# Patient Record
Sex: MALE | HISPANIC OR LATINO | Employment: FULL TIME | URBAN - METROPOLITAN AREA
[De-identification: names, ages, dates, MRNs, and addresses within clinical notes are randomized per-mention and may not be internally consistent; named-entity substitution may affect disease eponyms.]

---

## 2021-07-13 ENCOUNTER — HOSPITAL ENCOUNTER (EMERGENCY)
Facility: HOSPITAL | Age: 36
Discharge: HOME | End: 2021-07-13
Attending: EMERGENCY MEDICINE | Admitting: EMERGENCY MEDICINE

## 2021-07-13 ENCOUNTER — APPOINTMENT (OUTPATIENT)
Dept: CT IMAGING | Facility: HOSPITAL | Age: 36
End: 2021-07-13

## 2021-07-13 VITALS
WEIGHT: 180 LBS | SYSTOLIC BLOOD PRESSURE: 135 MMHG | OXYGEN SATURATION: 99 % | RESPIRATION RATE: 18 BRPM | TEMPERATURE: 98.7 F | DIASTOLIC BLOOD PRESSURE: 83 MMHG | HEART RATE: 88 BPM | HEIGHT: 68 IN | BODY MASS INDEX: 27.28 KG/M2

## 2021-07-13 DIAGNOSIS — R14.0 ABDOMINAL BLOATING: Primary | ICD-10-CM

## 2021-07-13 LAB
ALBUMIN SERPL-MCNC: 4.4 G/DL (ref 3.5–5.2)
ALBUMIN/GLOB SERPL: 1.4 G/DL
ALP SERPL-CCNC: 60 U/L (ref 39–117)
ALT SERPL W P-5'-P-CCNC: 21 U/L (ref 1–41)
ANION GAP SERPL CALCULATED.3IONS-SCNC: 9.1 MMOL/L (ref 5–15)
AST SERPL-CCNC: 15 U/L (ref 1–40)
BACTERIA UR QL AUTO: ABNORMAL /HPF
BASOPHILS # BLD AUTO: 0.03 10*3/MM3 (ref 0–0.2)
BASOPHILS NFR BLD AUTO: 0.4 % (ref 0–1.5)
BILIRUB SERPL-MCNC: 0.4 MG/DL (ref 0–1.2)
BILIRUB UR QL STRIP: NEGATIVE
BUN SERPL-MCNC: 11 MG/DL (ref 6–20)
BUN/CREAT SERPL: 13.8 (ref 7–25)
CALCIUM SPEC-SCNC: 9.5 MG/DL (ref 8.6–10.5)
CHLORIDE SERPL-SCNC: 101 MMOL/L (ref 98–107)
CLARITY UR: CLEAR
CO2 SERPL-SCNC: 25.9 MMOL/L (ref 22–29)
COLOR UR: YELLOW
CREAT SERPL-MCNC: 0.8 MG/DL (ref 0.76–1.27)
DEPRECATED RDW RBC AUTO: 40.9 FL (ref 37–54)
EOSINOPHIL # BLD AUTO: 0.18 10*3/MM3 (ref 0–0.4)
EOSINOPHIL NFR BLD AUTO: 2.1 % (ref 0.3–6.2)
ERYTHROCYTE [DISTWIDTH] IN BLOOD BY AUTOMATED COUNT: 13.2 % (ref 12.3–15.4)
GFR SERPL CREATININE-BSD FRML MDRD: 111 ML/MIN/1.73
GFR SERPL CREATININE-BSD FRML MDRD: 134 ML/MIN/1.73
GLOBULIN UR ELPH-MCNC: 3.2 GM/DL
GLUCOSE SERPL-MCNC: 92 MG/DL (ref 65–99)
GLUCOSE UR STRIP-MCNC: NEGATIVE MG/DL
HCT VFR BLD AUTO: 46.6 % (ref 37.5–51)
HGB BLD-MCNC: 15.7 G/DL (ref 13–17.7)
HGB UR QL STRIP.AUTO: NEGATIVE
HOLD SPECIMEN: NORMAL
HOLD SPECIMEN: NORMAL
HYALINE CASTS UR QL AUTO: ABNORMAL /LPF
IMM GRANULOCYTES # BLD AUTO: 0.03 10*3/MM3 (ref 0–0.05)
IMM GRANULOCYTES NFR BLD AUTO: 0.4 % (ref 0–0.5)
KETONES UR QL STRIP: NEGATIVE
LEUKOCYTE ESTERASE UR QL STRIP.AUTO: ABNORMAL
LIPASE SERPL-CCNC: 28 U/L (ref 13–60)
LYMPHOCYTES # BLD AUTO: 2.25 10*3/MM3 (ref 0.7–3.1)
LYMPHOCYTES NFR BLD AUTO: 26.7 % (ref 19.6–45.3)
MCH RBC QN AUTO: 28.9 PG (ref 26.6–33)
MCHC RBC AUTO-ENTMCNC: 33.7 G/DL (ref 31.5–35.7)
MCV RBC AUTO: 85.7 FL (ref 79–97)
MONOCYTES # BLD AUTO: 0.8 10*3/MM3 (ref 0.1–0.9)
MONOCYTES NFR BLD AUTO: 9.5 % (ref 5–12)
NEUTROPHILS NFR BLD AUTO: 5.13 10*3/MM3 (ref 1.7–7)
NEUTROPHILS NFR BLD AUTO: 60.9 % (ref 42.7–76)
NITRITE UR QL STRIP: NEGATIVE
NRBC BLD AUTO-RTO: 0 /100 WBC (ref 0–0.2)
PH UR STRIP.AUTO: 7 [PH] (ref 5–8)
PLATELET # BLD AUTO: 206 10*3/MM3 (ref 140–450)
PMV BLD AUTO: 9.8 FL (ref 6–12)
POTASSIUM SERPL-SCNC: 4 MMOL/L (ref 3.5–5.2)
PROT SERPL-MCNC: 7.6 G/DL (ref 6–8.5)
PROT UR QL STRIP: NEGATIVE
RBC # BLD AUTO: 5.44 10*6/MM3 (ref 4.14–5.8)
RBC # UR: ABNORMAL /HPF
REF LAB TEST METHOD: ABNORMAL
SODIUM SERPL-SCNC: 136 MMOL/L (ref 136–145)
SP GR UR STRIP: 1.02 (ref 1–1.03)
SQUAMOUS #/AREA URNS HPF: ABNORMAL /HPF
UROBILINOGEN UR QL STRIP: ABNORMAL
WBC # BLD AUTO: 8.42 10*3/MM3 (ref 3.4–10.8)
WBC UR QL AUTO: ABNORMAL /HPF
WHOLE BLOOD HOLD SPECIMEN: NORMAL

## 2021-07-13 PROCEDURE — 85025 COMPLETE CBC W/AUTO DIFF WBC: CPT

## 2021-07-13 PROCEDURE — 81001 URINALYSIS AUTO W/SCOPE: CPT

## 2021-07-13 PROCEDURE — 25010000002 IOPAMIDOL 61 % SOLUTION: Performed by: EMERGENCY MEDICINE

## 2021-07-13 PROCEDURE — 99283 EMERGENCY DEPT VISIT LOW MDM: CPT

## 2021-07-13 PROCEDURE — 80053 COMPREHEN METABOLIC PANEL: CPT

## 2021-07-13 PROCEDURE — 83690 ASSAY OF LIPASE: CPT

## 2021-07-13 PROCEDURE — 74177 CT ABD & PELVIS W/CONTRAST: CPT

## 2021-07-13 PROCEDURE — 36415 COLL VENOUS BLD VENIPUNCTURE: CPT

## 2021-07-13 RX ORDER — SODIUM CHLORIDE 0.9 % (FLUSH) 0.9 %
10 SYRINGE (ML) INJECTION AS NEEDED
Status: DISCONTINUED | OUTPATIENT
Start: 2021-07-13 | End: 2021-07-13 | Stop reason: HOSPADM

## 2021-07-13 RX ORDER — ONDANSETRON 2 MG/ML
4 INJECTION INTRAMUSCULAR; INTRAVENOUS ONCE
Status: DISCONTINUED | OUTPATIENT
Start: 2021-07-13 | End: 2021-07-13 | Stop reason: HOSPADM

## 2021-07-13 RX ORDER — OMEPRAZOLE 40 MG/1
40 CAPSULE, DELAYED RELEASE ORAL DAILY
Qty: 15 CAPSULE | Refills: 0 | Status: SHIPPED | OUTPATIENT
Start: 2021-07-13 | End: 2021-07-28

## 2021-07-13 RX ADMIN — IOPAMIDOL 85 ML: 612 INJECTION, SOLUTION INTRAVENOUS at 16:47

## 2021-07-13 RX ADMIN — SODIUM CHLORIDE, POTASSIUM CHLORIDE, SODIUM LACTATE AND CALCIUM CHLORIDE 1000 ML: 600; 310; 30; 20 INJECTION, SOLUTION INTRAVENOUS at 16:15

## 2021-07-13 NOTE — ED NOTES
Pt tolerates drinking a cup of water with no signs of nausea or vomiting, denies any pain.     Pedro Bowen RN  07/13/21 9612

## 2021-07-13 NOTE — ED NOTES
Patient states he has been having intermittent diffuse abdominal pain and feels bloated for 6 months. Patient states the pain was worse last night bringing him in today. Associated nausea.      Job, Luisa, MERLIN  07/13/21 1392

## 2021-07-13 NOTE — CASE MANAGEMENT/SOCIAL WORK
English print clinic list given to pt. Also-information on [place], [place] given to pt. Debra Paiz RN

## 2021-07-13 NOTE — ED PROVIDER NOTES
Subjective   35-year-old male with no significant past medical history here for chief complaint of abdominal bloating.  History is obtained from the patient.  The patient states that he has had abdominal bloating on and off for past 6 months.  He states that he had another episode yesterday.  This concerned him therefore he came to the ED.  Patient states that when he has that he does have some diffuse abdominal pain.  He states that currently has no abdominal pain.  Patient states that he does have some nausea but denies any vomiting.  Patient states that he does have sometimes diarrhea sometimes constipation.  Currently the patient denies any symptoms.  He denies any testicular pain.  He denies any urinary pain or burning.  Patient denies any chest pain or shortness of breath.  He denies any fevers or chills.  He denies any previous history of abdominal surgeries.  Of note, patient states that he was seen at a clinic approximately few months ago.  He states that he was told that his lab work looked fine and did not need any further work-up.  He states that he is eating spicy food.  He denies a lot of coffee intake.           Review of Systems   All other systems reviewed and are negative.      No past medical history on file.    No Known Allergies    No past surgical history on file.    No family history on file.    Social History     Socioeconomic History   • Marital status: Unknown     Spouse name: Not on file   • Number of children: Not on file   • Years of education: Not on file   • Highest education level: Not on file   Tobacco Use   • Smoking status: Never Smoker   • Smokeless tobacco: Never Used   Substance and Sexual Activity   • Alcohol use: Yes     Comment: rarely   • Drug use: Never   • Sexual activity: Defer           Objective   Physical Exam  Vitals reviewed.   Constitutional:       General: He is not in acute distress.     Appearance: He is not ill-appearing, toxic-appearing or diaphoretic.   HENT:       Head: Normocephalic and atraumatic.      Mouth/Throat:      Mouth: Mucous membranes are moist.      Pharynx: Oropharynx is clear. No pharyngeal swelling or oropharyngeal exudate.   Eyes:      General: No scleral icterus.     Extraocular Movements: Extraocular movements intact.      Pupils: Pupils are equal, round, and reactive to light.   Cardiovascular:      Rate and Rhythm: Normal rate.      Heart sounds: Normal heart sounds. No murmur heard.   No friction rub. No gallop.    Pulmonary:      Effort: Pulmonary effort is normal. No respiratory distress.      Breath sounds: Normal breath sounds. No stridor. No wheezing, rhonchi or rales.   Chest:      Chest wall: No tenderness.   Abdominal:      General: Abdomen is flat. Bowel sounds are normal. There is no distension or abdominal bruit. There are no signs of injury.      Palpations: Abdomen is soft. There is no shifting dullness, fluid wave, hepatomegaly, splenomegaly, mass or pulsatile mass.      Tenderness: There is no abdominal tenderness. There is no right CVA tenderness, left CVA tenderness, guarding or rebound.      Hernia: No hernia is present.   Skin:     General: Skin is warm and dry.      Capillary Refill: Capillary refill takes less than 2 seconds.      Coloration: Skin is not cyanotic or jaundiced.   Neurological:      Mental Status: He is alert and oriented to person, place, and time.      Motor: No weakness.   Psychiatric:         Mood and Affect: Mood normal.         Behavior: Behavior normal.         Procedures           ED Course  ED Course as of Jul 13 1752   Tue Jul 13, 2021   1557 Leukocytes, UA(!): Moderate (2+) [KS]   1619 Squamous Epithelial Cells, UA(!): 3-6 [KS]   1620 WBC, UA(!): 3-5 [KS]      ED Course User Index  [KS] Ernie Pham MD                                           MDM  Number of Diagnoses or Management Options     Amount and/or Complexity of Data Reviewed  Clinical lab tests: ordered and reviewed  Tests in the radiology  section of CPT®: ordered and reviewed    Risk of Complications, Morbidity, and/or Mortality  General comments: When I first saw the patient, the patient appeared nontoxic.  Patient had no obvious abdominal pain on my exam.  However, he did complain of bloating and given that this was a second visit, I decide to get an IV and labs.  Patient's urine was not a clean-catch.  Patient denied any urinary symptoms to me.  He denied any testicular pain making torsion less likely.  My suspicion for UTI was also lower given that he had no dysuria or pain.  Patient notes abdominal pain therefore I think an obstruction, appendicitis, diverticulitis, pancreatitis, gallbladder pathology is less likely.  Additionally, the labs are complete normal.  I did get a CT of the abdomen pelvis.  This was negative for an acute pathology. Patient was told to lay off of spicy foods and to reduce intake of greasy foods.  He was given a prescription for omeprazole 40 mg daily for 15 days.  He was told to follow-up with PCP in 2 to 3 days.  I did have the  see the patient to help him follow-up with the PCP.  The patient was also told to follow-up with a GI physician for an appointment if he continues to have pain.  Patient was given strict return precautions.  All questions were answered.  Patient was discharged in stable condition.  I had ordered IV Zofran but the patient had no nausea or vomiting while in the ED and refused it.  Patient was successfully p.o. challenge prior to discharge.    Patient Progress  Patient progress: improved      Final diagnoses:   Abdominal bloating       ED Disposition  ED Disposition     ED Disposition Condition Comment    Discharge Stable           Provider, No Known  [facility] [address] [phone]    Schedule an appointment as soon as possible for a visit in 2 days      Herberth Nunez MD  [address]  [address]  [facility] [address] [phone]    Schedule an  appointment as soon as possible for a visit in 1 week           Medication List      New Prescriptions    omeprazole 40 MG capsule  Commonly known as: priLOSEC  Take 1 capsule by mouth Daily for 15 days.           Where to Get Your Medications      You can get these medications from any pharmacy    Bring a paper prescription for each of these medications  · omeprazole 40 MG capsule          Ernie Pham MD  07/13/21 2839

## 2021-07-13 NOTE — DISCHARGE INSTRUCTIONS
Please return to the emergency department immediately if you have any abdominal pain, nausea, vomiting, fevers, urinary symptoms, or any other symptoms.

## 2024-07-02 ENCOUNTER — OFFICE VISIT (OUTPATIENT)
Dept: FAMILY MEDICINE CLINIC | Facility: CLINIC | Age: 39
End: 2024-07-02

## 2024-07-02 ENCOUNTER — TELEPHONE (OUTPATIENT)
Dept: FAMILY MEDICINE CLINIC | Facility: CLINIC | Age: 39
End: 2024-07-02

## 2024-07-02 VITALS
OXYGEN SATURATION: 98 % | BODY MASS INDEX: 28.29 KG/M2 | RESPIRATION RATE: 20 BRPM | HEIGHT: 69 IN | DIASTOLIC BLOOD PRESSURE: 90 MMHG | TEMPERATURE: 97.5 F | WEIGHT: 191 LBS | HEART RATE: 100 BPM | SYSTOLIC BLOOD PRESSURE: 120 MMHG

## 2024-07-02 DIAGNOSIS — R00.0 TACHYCARDIA: ICD-10-CM

## 2024-07-02 DIAGNOSIS — Z13.220 LIPID SCREENING: Primary | ICD-10-CM

## 2024-07-02 DIAGNOSIS — R53.83 FATIGUE, UNSPECIFIED TYPE: ICD-10-CM

## 2024-07-02 DIAGNOSIS — R74.8 ELEVATED LIVER ENZYMES: ICD-10-CM

## 2024-07-02 DIAGNOSIS — R61 NIGHT SWEATS: ICD-10-CM

## 2024-07-02 NOTE — PROGRESS NOTES
"Chief Complaint  Fatigue (Extreme fatigue for about 2 weeks. 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 ) and Night Sweats (For about 2 weeks, happens at night time at home while sleeping. )    Subjective         Mickey Butts presents to [facility]  History of Present Illness  History obtained from patient with the help of .    38-year-old male establishing care today, he complains of fatigue, chills and night sweats for 2 weeks.  Patient states his symptoms are slightly improving.  Patient also reports cold intolerance, he feels cold when the AC is on.  Patient reports mild cough for the past week.  He also have occasional headache.  Patient moved [place] about 5 years ago from [place].    Patient states he was working outside under the sun for longer hours, however, he was hydrating well with water and Gatorade, 6-10 bottles.    Patient denies chest pain, palpitation, shortness of breath, abdominal pain, N/V/D, blurred vision, headache, earache, sore throat, lower extremity swelling, skin changes, hair changes.    Patient was treated with amoxicillin and prednisone on May 27 for pharyngitis and his symptoms resolved.    Objective     Review of Systems     History reviewed. No pertinent past medical history.   No current outpatient medications on file.   Social History     Socioeconomic History    Marital status: Unknown   Tobacco Use    Smoking status: Never    Smokeless tobacco: Never   Vaping Use    Vaping status: Never Used   Substance and Sexual Activity    Alcohol use: Yes     Comment: rarely    Drug use: Never    Sexual activity: Defer      Vital Signs:   /90   Pulse 100   Temp 97.5 °F (36.4 °C)   Resp 20   Ht 175.3 cm (69\")   Wt 86.6 kg (191 lb)   SpO2 98%   BMI 28.21 kg/m²       Physical Exam  Constitutional:       General: He is not in acute " distress.     Appearance: Normal appearance. He is not ill-appearing.   HENT:      Head: Normocephalic and atraumatic.      Right Ear: Tympanic membrane, ear canal and external ear normal.      Left Ear: Tympanic membrane and external ear normal.      Mouth/Throat:      Mouth: Mucous membranes are moist.      Tongue: No lesions. Tongue does not deviate from midline.      Pharynx: Oropharynx is clear. Uvula midline.      Tonsils: No tonsillar exudate or tonsillar abscesses. 3+ on the right. 3+ on the left.   Cardiovascular:      Rate and Rhythm: Normal rate and regular rhythm.      Pulses: Normal pulses.      Heart sounds: No murmur heard.  Pulmonary:      Effort: Pulmonary effort is normal.      Breath sounds: Normal breath sounds.   Abdominal:      General: Abdomen is flat.      Palpations: Abdomen is soft.      Tenderness: There is no abdominal tenderness.   Musculoskeletal:      Right lower leg: No edema.      Left lower leg: No edema.   Skin:     Capillary Refill: Capillary refill takes less than 2 seconds.   Neurological:      Mental Status: He is alert.   Psychiatric:         Mood and Affect: Mood normal.         Behavior: Behavior normal.        Result Review :                   In office EKG  Normal sinus rhythm, normal axis, QTc 386 normal MS and QRS duration, there is ST elevation in lead II, III and aVF with T wave inversion in lead III, without reciprocal changes representing early repolarization.    Assessment and Plan    Diagnoses and all orders for this visit:    1. Lipid screening (Primary)  -     Lipid Panel    2. Fatigue, unspecified type  -     XR Chest 2 View; Future  -     CBC & Differential  -     Comprehensive Metabolic Panel  -     Urinalysis With Microscopic - Urine, Clean Catch  -     Hemoglobin A1c  -     TSH Rfx On Abnormal To Free T4    3. Night sweats  -     XR Chest 2 View; Future  -     CBC & Differential  -     Comprehensive Metabolic Panel  -     Urinalysis With Microscopic - Urine,  Clean Catch  -     TSH Rfx On Abnormal To Free T4    4. Tachycardia  -     ECG 12 Lead      History and symptoms are consistent with hypothyroidism VS TB.    EKG showed normal sinus rhythm with early repolarization changes on lead to 3 and aVF, confirmed with cardiology at chest pain clinic.  Dr. Nash   Will get routine labs and check x-ray  Follow-up pending results.      Follow Up   No follow-ups on file.  Patient was given instructions and counseling regarding his condition or for health maintenance advice. Please see specific information pulled into the AVS if appropriate.

## 2024-07-03 LAB
ALBUMIN SERPL-MCNC: 4.2 G/DL (ref 4.1–5.1)
ALP SERPL-CCNC: 74 IU/L (ref 44–121)
ALT SERPL-CCNC: 112 IU/L (ref 0–44)
APPEARANCE UR: CLEAR
AST SERPL-CCNC: 83 IU/L (ref 0–40)
BACTERIA #/AREA URNS HPF: NORMAL /[HPF]
BASOPHILS # BLD AUTO: 0.2 X10E3/UL (ref 0–0.2)
BASOPHILS NFR BLD AUTO: 2 %
BILIRUB SERPL-MCNC: 0.7 MG/DL (ref 0–1.2)
BILIRUB UR QL STRIP: NEGATIVE
BUN SERPL-MCNC: 12 MG/DL (ref 6–20)
BUN/CREAT SERPL: 12 (ref 9–20)
CALCIUM SERPL-MCNC: 9.2 MG/DL (ref 8.7–10.2)
CASTS URNS QL MICRO: NORMAL /LPF
CHLORIDE SERPL-SCNC: 100 MMOL/L (ref 96–106)
CHOLEST SERPL-MCNC: 129 MG/DL (ref 100–199)
CO2 SERPL-SCNC: 23 MMOL/L (ref 20–29)
COLOR UR: YELLOW
CREAT SERPL-MCNC: 1.04 MG/DL (ref 0.76–1.27)
EGFRCR SERPLBLD CKD-EPI 2021: 95 ML/MIN/1.73
EOSINOPHIL # BLD AUTO: 0.1 X10E3/UL (ref 0–0.4)
EOSINOPHIL NFR BLD AUTO: 1 %
EPI CELLS #/AREA URNS HPF: NORMAL /HPF (ref 0–10)
ERYTHROCYTE [DISTWIDTH] IN BLOOD BY AUTOMATED COUNT: 13.8 % (ref 11.6–15.4)
GLOBULIN SER CALC-MCNC: 3.3 G/DL (ref 1.5–4.5)
GLUCOSE SERPL-MCNC: 96 MG/DL (ref 70–99)
GLUCOSE UR QL STRIP: NEGATIVE
HBA1C MFR BLD: 5.6 % (ref 4.8–5.6)
HCT VFR BLD AUTO: 44.4 % (ref 37.5–51)
HDLC SERPL-MCNC: 23 MG/DL
HGB BLD-MCNC: 14.5 G/DL (ref 13–17.7)
HGB UR QL STRIP: NEGATIVE
IMM GRANULOCYTES # BLD AUTO: 0 X10E3/UL (ref 0–0.1)
IMM GRANULOCYTES NFR BLD AUTO: 0 %
KETONES UR QL STRIP: NEGATIVE
LDLC SERPL CALC-MCNC: 70 MG/DL (ref 0–99)
LEUKOCYTE ESTERASE UR QL STRIP: NEGATIVE
LYMPHOCYTES # BLD AUTO: 6.4 X10E3/UL (ref 0.7–3.1)
LYMPHOCYTES NFR BLD AUTO: 63 %
MCH RBC QN AUTO: 28.8 PG (ref 26.6–33)
MCHC RBC AUTO-ENTMCNC: 32.7 G/DL (ref 31.5–35.7)
MCV RBC AUTO: 88 FL (ref 79–97)
MICRO URNS: NORMAL
MICRO URNS: NORMAL
MONOCYTES # BLD AUTO: 0.9 X10E3/UL (ref 0.1–0.9)
MONOCYTES NFR BLD AUTO: 9 %
MORPHOLOGY BLD-IMP: ABNORMAL
NEUTROPHILS # BLD AUTO: 2.6 X10E3/UL (ref 1.4–7)
NEUTROPHILS NFR BLD AUTO: 25 %
NITRITE UR QL STRIP: NEGATIVE
PH UR STRIP: 6.5 [PH] (ref 5–7.5)
PLATELET # BLD AUTO: 162 X10E3/UL (ref 150–450)
POTASSIUM SERPL-SCNC: 4.4 MMOL/L (ref 3.5–5.2)
PROT SERPL-MCNC: 7.5 G/DL (ref 6–8.5)
PROT UR QL STRIP: NORMAL
RBC # BLD AUTO: 5.03 X10E6/UL (ref 4.14–5.8)
RBC #/AREA URNS HPF: NORMAL /HPF (ref 0–2)
SODIUM SERPL-SCNC: 138 MMOL/L (ref 134–144)
SP GR UR STRIP: 1.02 (ref 1–1.03)
TRIGL SERPL-MCNC: 213 MG/DL (ref 0–149)
TSH SERPL DL<=0.005 MIU/L-ACNC: 2.92 UIU/ML (ref 0.45–4.5)
UROBILINOGEN UR STRIP-MCNC: 0.2 MG/DL (ref 0.2–1)
VLDLC SERPL CALC-MCNC: 36 MG/DL (ref 5–40)
WBC # BLD AUTO: 10.1 X10E3/UL (ref 3.4–10.8)
WBC #/AREA URNS HPF: NORMAL /HPF (ref 0–5)

## 2024-07-12 DIAGNOSIS — R53.83 FATIGUE, UNSPECIFIED TYPE: ICD-10-CM

## 2024-07-12 DIAGNOSIS — R61 NIGHT SWEATS: ICD-10-CM

## 2024-07-13 LAB
FERRITIN SERPL-MCNC: 433 NG/ML (ref 30–400)
HAV AB SER QL IA: POSITIVE
HAV IGM SERPL QL IA: NEGATIVE
HBV CORE AB SERPL QL IA: NEGATIVE
HBV SURFACE AB SER QL: NORMAL
HBV SURFACE AG SERPL QL IA: NEGATIVE
HCV IGG SERPL QL IA: NON REACTIVE
IRON SATN MFR SERPL: 35 % (ref 15–55)
IRON SERPL-MCNC: 98 UG/DL (ref 38–169)
TIBC SERPL-MCNC: 281 UG/DL (ref 250–450)
UIBC SERPL-MCNC: 183 UG/DL (ref 111–343)

## 2024-08-26 ENCOUNTER — HOSPITAL ENCOUNTER (OUTPATIENT)
Facility: HOSPITAL | Age: 39
Discharge: HOME | End: 2024-08-26
Admitting: FAMILY MEDICINE

## 2024-08-26 DIAGNOSIS — R74.8 ELEVATED LIVER ENZYMES: ICD-10-CM

## 2024-08-26 PROCEDURE — 76705 ECHO EXAM OF ABDOMEN: CPT

## 2024-08-30 DIAGNOSIS — R16.0 LIVER MASSES: Primary | ICD-10-CM

## 2024-08-30 DIAGNOSIS — R74.8 ELEVATED LIVER ENZYMES: ICD-10-CM
